# Patient Record
Sex: FEMALE
[De-identification: names, ages, dates, MRNs, and addresses within clinical notes are randomized per-mention and may not be internally consistent; named-entity substitution may affect disease eponyms.]

---

## 2017-10-25 ENCOUNTER — HOSPITAL (OUTPATIENT)
Dept: OTHER | Age: 67
End: 2017-10-25
Attending: ORTHOPAEDIC SURGERY

## 2017-11-30 ENCOUNTER — HOSPITAL (OUTPATIENT)
Dept: OTHER | Age: 67
End: 2017-11-30
Attending: INTERNAL MEDICINE

## 2017-11-30 LAB
ANALYZER ANC (IANC): ABNORMAL
ANION GAP SERPL CALC-SCNC: 10 MMOL/L (ref 10–20)
APPEARANCE FLD: ABNORMAL
BASOPHILS # BLD: 0 THOUSAND/MCL (ref 0–0.3)
BASOPHILS NFR BLD: 0 %
BASOPHILS NFR SNV: ABNORMAL %
BUN SERPL-MCNC: 8 MG/DL (ref 6–20)
BUN/CREAT SERPL: 9 (ref 7–25)
CALCIUM SERPL-MCNC: 9.3 MG/DL (ref 8.4–10.2)
CHLORIDE: 103 MMOL/L (ref 98–107)
CO2 SERPL-SCNC: 36 MMOL/L (ref 21–32)
CREAT SERPL-MCNC: 0.87 MG/DL (ref 0.51–0.95)
CRYSTALS SNV MICRO: ABNORMAL
DIFFERENTIAL METHOD BLD: ABNORMAL
EOSINOPHIL # BLD: 0 THOUSAND/MCL (ref 0.1–0.5)
EOSINOPHIL NFR BLD: 1 %
EOSINOPHIL NFR SNV: ABNORMAL %
ERYTHROCYTE [DISTWIDTH] IN BLOOD: 16.3 % (ref 11–15)
GLUCOSE SERPL-MCNC: 109 MG/DL (ref 65–99)
HEMATOCRIT: 30.9 % (ref 36–46.5)
HGB BLD-MCNC: 9.7 GM/DL (ref 12–15.5)
LYMPHOCYTES # BLD: 1.6 THOUSAND/MCL (ref 1–4)
LYMPHOCYTES NFR BLD: 21 %
LYMPHOCYTES NFR SNV: 4 % (ref 0–78)
MCH RBC QN AUTO: 25.3 PG (ref 26–34)
MCHC RBC AUTO-ENTMCNC: 31.4 GM/DL (ref 32–36.5)
MCV RBC AUTO: 80.7 FL (ref 78–100)
MONOCYTES # BLD: 0.6 THOUSAND/MCL (ref 0.3–0.9)
MONOCYTES NFR BLD: 7 %
MONOS+MACROS NFR SNV: 3 % (ref 0–71)
NEUTROPHILS # BLD: 5.3 THOUSAND/MCL (ref 1.8–7.7)
NEUTROPHILS NFR BLD: 71 %
NEUTS SEG NFR BLD: ABNORMAL %
NEUTS SEG NFR SNV: 93 % (ref 0–25)
NUC CELL # SNV MANUAL: ABNORMAL /MCL (ref 0–200)
PERCENT NRBC: ABNORMAL
PLATELET # BLD: 316 THOUSAND/MCL (ref 140–450)
POTASSIUM SERPL-SCNC: 3.3 MMOL/L (ref 3.4–5.1)
RBC # BLD: 3.83 MILLION/MCL (ref 4–5.2)
SODIUM SERPL-SCNC: 146 MMOL/L (ref 135–145)
SPECIMEN SOURCE: ABNORMAL
SPECIMEN VOL FLD: 38 ML
WBC # BLD: 7.5 THOUSAND/MCL (ref 4.2–11)

## 2017-12-01 LAB
ANALYZER ANC (IANC): ABNORMAL
ANION GAP SERPL CALC-SCNC: 6 MMOL/L (ref 10–20)
BASOPHILS # BLD: 0 THOUSAND/MCL (ref 0–0.3)
BASOPHILS NFR BLD: 0 %
BUN SERPL-MCNC: 9 MG/DL (ref 6–20)
BUN/CREAT SERPL: 12 (ref 7–25)
CALCIUM SERPL-MCNC: 8.6 MG/DL (ref 8.4–10.2)
CHLORIDE: 106 MMOL/L (ref 98–107)
CO2 SERPL-SCNC: 34 MMOL/L (ref 21–32)
CREAT SERPL-MCNC: 0.73 MG/DL (ref 0.51–0.95)
DIFFERENTIAL METHOD BLD: ABNORMAL
EOSINOPHIL # BLD: 0 THOUSAND/MCL (ref 0.1–0.5)
EOSINOPHIL NFR BLD: 0 %
ERYTHROCYTE [DISTWIDTH] IN BLOOD: 16.1 % (ref 11–15)
GLUCOSE SERPL-MCNC: 127 MG/DL (ref 65–99)
HEMATOCRIT: 28.1 % (ref 36–46.5)
HGB BLD-MCNC: 8.9 GM/DL (ref 12–15.5)
INR PPP: 1.1
LYMPHOCYTES # BLD: 1 THOUSAND/MCL (ref 1–4)
LYMPHOCYTES NFR BLD: 15 %
MCH RBC QN AUTO: 25.4 PG (ref 26–34)
MCHC RBC AUTO-ENTMCNC: 31.7 GM/DL (ref 32–36.5)
MCV RBC AUTO: 80.1 FL (ref 78–100)
MONOCYTES # BLD: 0.2 THOUSAND/MCL (ref 0.3–0.9)
MONOCYTES NFR BLD: 2 %
NEUTROPHILS # BLD: 5.8 THOUSAND/MCL (ref 1.8–7.7)
NEUTROPHILS NFR BLD: 83 %
NEUTS SEG NFR BLD: ABNORMAL %
PERCENT NRBC: ABNORMAL
PLATELET # BLD: 284 THOUSAND/MCL (ref 140–450)
POTASSIUM SERPL-SCNC: 4 MMOL/L (ref 3.4–5.1)
PROTHROMBIN TIME: 11.6 SECONDS (ref 9.7–11.8)
PROTHROMBIN TIME: NORMAL
RBC # BLD: 3.51 MILLION/MCL (ref 4–5.2)
SODIUM SERPL-SCNC: 142 MMOL/L (ref 135–145)
WBC # BLD: 7 THOUSAND/MCL (ref 4.2–11)

## 2017-12-02 LAB
ANALYZER ANC (IANC): ABNORMAL
ANION GAP SERPL CALC-SCNC: 8 MMOL/L (ref 10–20)
BASOPHILS # BLD: 0 THOUSAND/MCL (ref 0–0.3)
BASOPHILS NFR BLD: 0 %
BUN SERPL-MCNC: 8 MG/DL (ref 6–20)
BUN/CREAT SERPL: 10 (ref 7–25)
CALCIUM SERPL-MCNC: 8.2 MG/DL (ref 8.4–10.2)
CHLORIDE: 107 MMOL/L (ref 98–107)
CO2 SERPL-SCNC: 31 MMOL/L (ref 21–32)
CREAT SERPL-MCNC: 0.83 MG/DL (ref 0.51–0.95)
DIFFERENTIAL METHOD BLD: ABNORMAL
EOSINOPHIL # BLD: 0 THOUSAND/MCL (ref 0.1–0.5)
EOSINOPHIL NFR BLD: 0 %
ERYTHROCYTE [DISTWIDTH] IN BLOOD: 16.3 % (ref 11–15)
GLUCOSE SERPL-MCNC: 115 MG/DL (ref 65–99)
HEMATOCRIT: 26.1 % (ref 36–46.5)
HGB BLD-MCNC: 8.3 GM/DL (ref 12–15.5)
LYMPHOCYTES # BLD: 1.5 THOUSAND/MCL (ref 1–4)
LYMPHOCYTES NFR BLD: 15 %
MCH RBC QN AUTO: 25.5 PG (ref 26–34)
MCHC RBC AUTO-ENTMCNC: 31.8 GM/DL (ref 32–36.5)
MCV RBC AUTO: 80.3 FL (ref 78–100)
MONOCYTES # BLD: 0.5 THOUSAND/MCL (ref 0.3–0.9)
MONOCYTES NFR BLD: 5 %
NEUTROPHILS # BLD: 8 THOUSAND/MCL (ref 1.8–7.7)
NEUTROPHILS NFR BLD: 80 %
NEUTS SEG NFR BLD: ABNORMAL %
PERCENT NRBC: ABNORMAL
PLATELET # BLD: 262 THOUSAND/MCL (ref 140–450)
POTASSIUM SERPL-SCNC: 3.1 MMOL/L (ref 3.4–5.1)
RBC # BLD: 3.25 MILLION/MCL (ref 4–5.2)
SODIUM SERPL-SCNC: 143 MMOL/L (ref 135–145)
WBC # BLD: 10.1 THOUSAND/MCL (ref 4.2–11)

## 2018-10-16 PROCEDURE — 81003 URINALYSIS AUTO W/O SCOPE: CPT | Performed by: INTERNAL MEDICINE

## 2019-01-01 ENCOUNTER — EXTERNAL RECORD (OUTPATIENT)
Dept: HEALTH INFORMATION MANAGEMENT | Facility: OTHER | Age: 69
End: 2019-01-01

## 2019-01-09 PROCEDURE — 87086 URINE CULTURE/COLONY COUNT: CPT | Performed by: INTERNAL MEDICINE

## 2019-01-09 PROCEDURE — 81001 URINALYSIS AUTO W/SCOPE: CPT | Performed by: INTERNAL MEDICINE

## 2019-01-15 PROCEDURE — 87624 HPV HI-RISK TYP POOLED RSLT: CPT | Performed by: OBSTETRICS & GYNECOLOGY

## 2019-01-15 PROCEDURE — 87625 HPV TYPES 16 & 18 ONLY: CPT | Performed by: OBSTETRICS & GYNECOLOGY

## 2019-01-15 PROCEDURE — 88175 CYTOPATH C/V AUTO FLUID REDO: CPT | Performed by: OBSTETRICS & GYNECOLOGY

## 2019-02-04 PROCEDURE — 88305 TISSUE EXAM BY PATHOLOGIST: CPT | Performed by: INTERNAL MEDICINE

## 2019-09-26 PROCEDURE — 81001 URINALYSIS AUTO W/SCOPE: CPT | Performed by: INTERNAL MEDICINE

## 2019-09-26 RX ORDER — HYDROCODONE BITARTRATE AND ACETAMINOPHEN 10; 325 MG/1; MG/1
1 TABLET ORAL EVERY 6 HOURS PRN
COMMUNITY

## 2019-09-30 ENCOUNTER — HOSPITAL ENCOUNTER (INPATIENT)
Facility: HOSPITAL | Age: 69
LOS: 2 days | Discharge: HOME HEALTH | DRG: 470 | End: 2019-10-02
Attending: ORTHOPAEDIC SURGERY | Admitting: ORTHOPAEDIC SURGERY
Payer: MEDICARE

## 2019-09-30 ENCOUNTER — ANESTHESIA EVENT (OUTPATIENT)
Dept: SURGERY | Facility: HOSPITAL | Age: 69
DRG: 470 | End: 2019-09-30
Payer: MEDICARE

## 2019-09-30 ENCOUNTER — APPOINTMENT (OUTPATIENT)
Dept: GENERAL RADIOLOGY | Facility: HOSPITAL | Age: 69
DRG: 470 | End: 2019-09-30
Attending: ORTHOPAEDIC SURGERY
Payer: MEDICARE

## 2019-09-30 ENCOUNTER — ANESTHESIA (OUTPATIENT)
Dept: SURGERY | Facility: HOSPITAL | Age: 69
DRG: 470 | End: 2019-09-30
Payer: MEDICARE

## 2019-09-30 PROBLEM — M17.10 ARTHRITIS OF KNEE, DEGENERATIVE: Status: ACTIVE | Noted: 2019-09-30

## 2019-09-30 PROCEDURE — 88311 DECALCIFY TISSUE: CPT | Performed by: ORTHOPAEDIC SURGERY

## 2019-09-30 PROCEDURE — 88305 TISSUE EXAM BY PATHOLOGIST: CPT | Performed by: ORTHOPAEDIC SURGERY

## 2019-09-30 PROCEDURE — 97110 THERAPEUTIC EXERCISES: CPT

## 2019-09-30 PROCEDURE — 73560 X-RAY EXAM OF KNEE 1 OR 2: CPT | Performed by: ORTHOPAEDIC SURGERY

## 2019-09-30 PROCEDURE — S0028 INJECTION, FAMOTIDINE, 20 MG: HCPCS

## 2019-09-30 PROCEDURE — 0SRC0J9 REPLACEMENT OF RIGHT KNEE JOINT WITH SYNTHETIC SUBSTITUTE, CEMENTED, OPEN APPROACH: ICD-10-PCS | Performed by: ORTHOPAEDIC SURGERY

## 2019-09-30 PROCEDURE — 97162 PT EVAL MOD COMPLEX 30 MIN: CPT

## 2019-09-30 DEVICE — SIGMA LCS COMPLETE TIBIAL TRAY ROTATING PLATFORM M.B.T. KEEL POROCOAT SIZE 3 CEMENTLESS
Type: IMPLANTABLE DEVICE | Site: KNEE | Status: FUNCTIONAL
Brand: SIGMA LCS COMPLETE POROCOAT

## 2019-09-30 DEVICE — P.F.C. SIGMA TIBIAL INSERT FIXED BEARING CURVED 3 (CVD) 12.5MM GVF
Type: IMPLANTABLE DEVICE | Site: KNEE | Status: FUNCTIONAL
Brand: P.F.C. SIGMA

## 2019-09-30 DEVICE — TOTAL KNEE SIGMA RP POROUS: Type: IMPLANTABLE DEVICE | Status: FUNCTIONAL

## 2019-09-30 DEVICE — P.F.C. SIGMA OVAL DOME PATELLA 3-PEG 41MM CEMENTED
Type: IMPLANTABLE DEVICE | Site: KNEE | Status: FUNCTIONAL
Brand: P.F.C. SIGMA

## 2019-09-30 RX ORDER — DICYCLOMINE HCL 20 MG
20 TABLET ORAL EVERY 4 HOURS PRN
Status: DISCONTINUED | OUTPATIENT
Start: 2019-09-30 | End: 2019-10-02

## 2019-09-30 RX ORDER — CYCLOBENZAPRINE HCL 5 MG
5 TABLET ORAL 3 TIMES DAILY PRN
Status: DISCONTINUED | OUTPATIENT
Start: 2019-09-30 | End: 2019-10-02

## 2019-09-30 RX ORDER — DIPHENHYDRAMINE HYDROCHLORIDE 50 MG/ML
12.5 INJECTION INTRAMUSCULAR; INTRAVENOUS EVERY 4 HOURS PRN
Status: DISCONTINUED | OUTPATIENT
Start: 2019-09-30 | End: 2019-10-02

## 2019-09-30 RX ORDER — ONDANSETRON 2 MG/ML
INJECTION INTRAMUSCULAR; INTRAVENOUS AS NEEDED
Status: DISCONTINUED | OUTPATIENT
Start: 2019-09-30 | End: 2019-09-30 | Stop reason: SURG

## 2019-09-30 RX ORDER — GABAPENTIN 600 MG/1
600 TABLET ORAL ONCE
Status: COMPLETED | OUTPATIENT
Start: 2019-09-30 | End: 2019-09-30

## 2019-09-30 RX ORDER — HYDROCODONE BITARTRATE AND ACETAMINOPHEN 5; 325 MG/1; MG/1
2 TABLET ORAL AS NEEDED
Status: DISCONTINUED | OUTPATIENT
Start: 2019-09-30 | End: 2019-09-30 | Stop reason: HOSPADM

## 2019-09-30 RX ORDER — DIPHENHYDRAMINE HCL 25 MG
25 CAPSULE ORAL EVERY 4 HOURS PRN
Status: DISCONTINUED | OUTPATIENT
Start: 2019-09-30 | End: 2019-10-02

## 2019-09-30 RX ORDER — ONDANSETRON 2 MG/ML
4 INJECTION INTRAMUSCULAR; INTRAVENOUS ONCE AS NEEDED
Status: DISCONTINUED | OUTPATIENT
Start: 2019-09-30 | End: 2019-09-30 | Stop reason: HOSPADM

## 2019-09-30 RX ORDER — SODIUM CHLORIDE 9 MG/ML
INJECTION, SOLUTION INTRAVENOUS CONTINUOUS
Status: DISCONTINUED | OUTPATIENT
Start: 2019-09-30 | End: 2019-10-01

## 2019-09-30 RX ORDER — ROCURONIUM BROMIDE 10 MG/ML
INJECTION, SOLUTION INTRAVENOUS AS NEEDED
Status: DISCONTINUED | OUTPATIENT
Start: 2019-09-30 | End: 2019-09-30 | Stop reason: SURG

## 2019-09-30 RX ORDER — MORPHINE SULFATE 10 MG/ML
6 INJECTION, SOLUTION INTRAMUSCULAR; INTRAVENOUS EVERY 10 MIN PRN
Status: DISCONTINUED | OUTPATIENT
Start: 2019-09-30 | End: 2019-09-30 | Stop reason: HOSPADM

## 2019-09-30 RX ORDER — SODIUM PHOSPHATE, DIBASIC AND SODIUM PHOSPHATE, MONOBASIC 7; 19 G/133ML; G/133ML
1 ENEMA RECTAL ONCE AS NEEDED
Status: DISCONTINUED | OUTPATIENT
Start: 2019-09-30 | End: 2019-10-02

## 2019-09-30 RX ORDER — DIPHENHYDRAMINE HYDROCHLORIDE 50 MG/ML
25 INJECTION INTRAMUSCULAR; INTRAVENOUS ONCE AS NEEDED
Status: ACTIVE | OUTPATIENT
Start: 2019-09-30 | End: 2019-09-30

## 2019-09-30 RX ORDER — MORPHINE SULFATE 4 MG/ML
4 INJECTION, SOLUTION INTRAMUSCULAR; INTRAVENOUS EVERY 2 HOUR PRN
Status: DISCONTINUED | OUTPATIENT
Start: 2019-09-30 | End: 2019-10-02

## 2019-09-30 RX ORDER — HYDROMORPHONE HYDROCHLORIDE 1 MG/ML
0.4 INJECTION, SOLUTION INTRAMUSCULAR; INTRAVENOUS; SUBCUTANEOUS EVERY 5 MIN PRN
Status: DISCONTINUED | OUTPATIENT
Start: 2019-09-30 | End: 2019-09-30 | Stop reason: HOSPADM

## 2019-09-30 RX ORDER — MORPHINE SULFATE 2 MG/ML
2 INJECTION, SOLUTION INTRAMUSCULAR; INTRAVENOUS EVERY 2 HOUR PRN
Status: DISCONTINUED | OUTPATIENT
Start: 2019-09-30 | End: 2019-10-02

## 2019-09-30 RX ORDER — SODIUM CHLORIDE, SODIUM LACTATE, POTASSIUM CHLORIDE, CALCIUM CHLORIDE 600; 310; 30; 20 MG/100ML; MG/100ML; MG/100ML; MG/100ML
INJECTION, SOLUTION INTRAVENOUS CONTINUOUS
Status: DISCONTINUED | OUTPATIENT
Start: 2019-09-30 | End: 2019-10-01

## 2019-09-30 RX ORDER — METOCLOPRAMIDE 10 MG/1
10 TABLET ORAL ONCE
Status: DISCONTINUED | OUTPATIENT
Start: 2019-09-30 | End: 2019-09-30 | Stop reason: HOSPADM

## 2019-09-30 RX ORDER — GLYCOPYRROLATE 0.2 MG/ML
INJECTION INTRAMUSCULAR; INTRAVENOUS AS NEEDED
Status: DISCONTINUED | OUTPATIENT
Start: 2019-09-30 | End: 2019-09-30 | Stop reason: SURG

## 2019-09-30 RX ORDER — FAMOTIDINE 10 MG/ML
20 INJECTION, SOLUTION INTRAVENOUS 2 TIMES DAILY
Status: DISCONTINUED | OUTPATIENT
Start: 2019-09-30 | End: 2019-10-01

## 2019-09-30 RX ORDER — PROCHLORPERAZINE EDISYLATE 5 MG/ML
10 INJECTION INTRAMUSCULAR; INTRAVENOUS EVERY 6 HOURS PRN
Status: ACTIVE | OUTPATIENT
Start: 2019-09-30 | End: 2019-10-02

## 2019-09-30 RX ORDER — SUMATRIPTAN 50 MG/1
50 TABLET, FILM COATED ORAL EVERY 2 HOUR PRN
Status: DISCONTINUED | OUTPATIENT
Start: 2019-09-30 | End: 2019-10-02

## 2019-09-30 RX ORDER — HYDRALAZINE HYDROCHLORIDE 20 MG/ML
10 INJECTION INTRAMUSCULAR; INTRAVENOUS EVERY 4 HOURS PRN
Status: DISCONTINUED | OUTPATIENT
Start: 2019-09-30 | End: 2019-10-02

## 2019-09-30 RX ORDER — HYDROCODONE BITARTRATE AND ACETAMINOPHEN 10; 325 MG/1; MG/1
1 TABLET ORAL EVERY 6 HOURS PRN
Status: DISCONTINUED | OUTPATIENT
Start: 2019-09-30 | End: 2019-10-02

## 2019-09-30 RX ORDER — SODIUM CHLORIDE, SODIUM LACTATE, POTASSIUM CHLORIDE, CALCIUM CHLORIDE 600; 310; 30; 20 MG/100ML; MG/100ML; MG/100ML; MG/100ML
INJECTION, SOLUTION INTRAVENOUS CONTINUOUS
Status: DISCONTINUED | OUTPATIENT
Start: 2019-09-30 | End: 2019-09-30 | Stop reason: HOSPADM

## 2019-09-30 RX ORDER — CYCLOBENZAPRINE HCL 10 MG
10 TABLET ORAL EVERY 8 HOURS PRN
Status: DISCONTINUED | OUTPATIENT
Start: 2019-09-30 | End: 2019-09-30

## 2019-09-30 RX ORDER — MIDAZOLAM HYDROCHLORIDE 1 MG/ML
INJECTION INTRAMUSCULAR; INTRAVENOUS AS NEEDED
Status: DISCONTINUED | OUTPATIENT
Start: 2019-09-30 | End: 2019-09-30 | Stop reason: SURG

## 2019-09-30 RX ORDER — LIDOCAINE HYDROCHLORIDE 10 MG/ML
INJECTION, SOLUTION EPIDURAL; INFILTRATION; INTRACAUDAL; PERINEURAL AS NEEDED
Status: DISCONTINUED | OUTPATIENT
Start: 2019-09-30 | End: 2019-09-30 | Stop reason: SURG

## 2019-09-30 RX ORDER — BISACODYL 10 MG
10 SUPPOSITORY, RECTAL RECTAL
Status: DISCONTINUED | OUTPATIENT
Start: 2019-09-30 | End: 2019-10-02

## 2019-09-30 RX ORDER — SCOLOPAMINE TRANSDERMAL SYSTEM 1 MG/1
1 PATCH, EXTENDED RELEASE TRANSDERMAL ONCE
Status: DISCONTINUED | OUTPATIENT
Start: 2019-09-30 | End: 2019-09-30

## 2019-09-30 RX ORDER — FAMOTIDINE 20 MG/1
20 TABLET ORAL ONCE
Status: DISCONTINUED | OUTPATIENT
Start: 2019-09-30 | End: 2019-09-30 | Stop reason: HOSPADM

## 2019-09-30 RX ORDER — FAMOTIDINE 20 MG/1
20 TABLET ORAL 2 TIMES DAILY
Status: DISCONTINUED | OUTPATIENT
Start: 2019-09-30 | End: 2019-10-01

## 2019-09-30 RX ORDER — HYDROCODONE BITARTRATE AND ACETAMINOPHEN 5; 325 MG/1; MG/1
1 TABLET ORAL AS NEEDED
Status: DISCONTINUED | OUTPATIENT
Start: 2019-09-30 | End: 2019-09-30 | Stop reason: HOSPADM

## 2019-09-30 RX ORDER — TOPIRAMATE 25 MG/1
25 TABLET ORAL 2 TIMES DAILY
Status: DISCONTINUED | OUTPATIENT
Start: 2019-09-30 | End: 2019-10-02

## 2019-09-30 RX ORDER — NALOXONE HYDROCHLORIDE 0.4 MG/ML
80 INJECTION, SOLUTION INTRAMUSCULAR; INTRAVENOUS; SUBCUTANEOUS AS NEEDED
Status: DISCONTINUED | OUTPATIENT
Start: 2019-09-30 | End: 2019-09-30 | Stop reason: HOSPADM

## 2019-09-30 RX ORDER — HALOPERIDOL 5 MG/ML
0.25 INJECTION INTRAMUSCULAR ONCE AS NEEDED
Status: DISCONTINUED | OUTPATIENT
Start: 2019-09-30 | End: 2019-09-30 | Stop reason: HOSPADM

## 2019-09-30 RX ORDER — HYDROCODONE BITARTRATE AND ACETAMINOPHEN 7.5; 325 MG/1; MG/1
2 TABLET ORAL EVERY 4 HOURS PRN
Status: DISCONTINUED | OUTPATIENT
Start: 2019-09-30 | End: 2019-10-02

## 2019-09-30 RX ORDER — HYDROMORPHONE HYDROCHLORIDE 1 MG/ML
0.6 INJECTION, SOLUTION INTRAMUSCULAR; INTRAVENOUS; SUBCUTANEOUS EVERY 5 MIN PRN
Status: DISCONTINUED | OUTPATIENT
Start: 2019-09-30 | End: 2019-09-30 | Stop reason: HOSPADM

## 2019-09-30 RX ORDER — ZOLPIDEM TARTRATE 10 MG/1
10 TABLET ORAL NIGHTLY
Status: DISCONTINUED | OUTPATIENT
Start: 2019-09-30 | End: 2019-10-02

## 2019-09-30 RX ORDER — VANCOMYCIN HYDROCHLORIDE 1 G/20ML
INJECTION, POWDER, LYOPHILIZED, FOR SOLUTION INTRAVENOUS AS NEEDED
Status: DISCONTINUED | OUTPATIENT
Start: 2019-09-30 | End: 2019-09-30 | Stop reason: HOSPADM

## 2019-09-30 RX ORDER — AMLODIPINE BESYLATE 2.5 MG/1
2.5 TABLET ORAL
Status: DISCONTINUED | OUTPATIENT
Start: 2019-09-30 | End: 2019-10-02

## 2019-09-30 RX ORDER — POLYETHYLENE GLYCOL 3350 17 G/17G
17 POWDER, FOR SOLUTION ORAL DAILY PRN
Status: DISCONTINUED | OUTPATIENT
Start: 2019-09-30 | End: 2019-10-02

## 2019-09-30 RX ORDER — DOCUSATE SODIUM 100 MG/1
100 CAPSULE, LIQUID FILLED ORAL 2 TIMES DAILY
Status: DISCONTINUED | OUTPATIENT
Start: 2019-09-30 | End: 2019-10-02

## 2019-09-30 RX ORDER — SODIUM CHLORIDE 0.9 % (FLUSH) 0.9 %
10 SYRINGE (ML) INJECTION AS NEEDED
Status: DISCONTINUED | OUTPATIENT
Start: 2019-09-30 | End: 2019-10-02

## 2019-09-30 RX ORDER — METOCLOPRAMIDE HYDROCHLORIDE 5 MG/ML
10 INJECTION INTRAMUSCULAR; INTRAVENOUS EVERY 6 HOURS PRN
Status: ACTIVE | OUTPATIENT
Start: 2019-09-30 | End: 2019-10-02

## 2019-09-30 RX ORDER — ESCITALOPRAM OXALATE 10 MG/1
10 TABLET ORAL NIGHTLY
Status: DISCONTINUED | OUTPATIENT
Start: 2019-09-30 | End: 2019-10-02

## 2019-09-30 RX ORDER — MORPHINE SULFATE 4 MG/ML
2 INJECTION, SOLUTION INTRAMUSCULAR; INTRAVENOUS EVERY 10 MIN PRN
Status: DISCONTINUED | OUTPATIENT
Start: 2019-09-30 | End: 2019-09-30 | Stop reason: HOSPADM

## 2019-09-30 RX ORDER — HYDRALAZINE HYDROCHLORIDE 25 MG/1
25 TABLET, FILM COATED ORAL EVERY 8 HOURS PRN
Status: DISCONTINUED | OUTPATIENT
Start: 2019-09-30 | End: 2019-10-02

## 2019-09-30 RX ORDER — MORPHINE SULFATE 2 MG/ML
1 INJECTION, SOLUTION INTRAMUSCULAR; INTRAVENOUS EVERY 2 HOUR PRN
Status: DISCONTINUED | OUTPATIENT
Start: 2019-09-30 | End: 2019-10-02

## 2019-09-30 RX ORDER — ACETAMINOPHEN 500 MG
1000 TABLET ORAL ONCE
Status: DISCONTINUED | OUTPATIENT
Start: 2019-09-30 | End: 2019-09-30 | Stop reason: HOSPADM

## 2019-09-30 RX ORDER — MORPHINE SULFATE 4 MG/ML
4 INJECTION, SOLUTION INTRAMUSCULAR; INTRAVENOUS EVERY 10 MIN PRN
Status: DISCONTINUED | OUTPATIENT
Start: 2019-09-30 | End: 2019-09-30 | Stop reason: HOSPADM

## 2019-09-30 RX ORDER — ACETAMINOPHEN 325 MG/1
650 TABLET ORAL EVERY 4 HOURS PRN
Status: DISCONTINUED | OUTPATIENT
Start: 2019-09-30 | End: 2019-10-02

## 2019-09-30 RX ORDER — PANTOPRAZOLE SODIUM 40 MG/1
40 TABLET, DELAYED RELEASE ORAL
Status: DISCONTINUED | OUTPATIENT
Start: 2019-10-01 | End: 2019-10-02

## 2019-09-30 RX ORDER — PROCHLORPERAZINE EDISYLATE 5 MG/ML
5 INJECTION INTRAMUSCULAR; INTRAVENOUS ONCE AS NEEDED
Status: DISCONTINUED | OUTPATIENT
Start: 2019-09-30 | End: 2019-09-30 | Stop reason: HOSPADM

## 2019-09-30 RX ORDER — HYDROCODONE BITARTRATE AND ACETAMINOPHEN 7.5; 325 MG/1; MG/1
1 TABLET ORAL EVERY 4 HOURS PRN
Status: DISCONTINUED | OUTPATIENT
Start: 2019-09-30 | End: 2019-10-02

## 2019-09-30 RX ORDER — HYDROMORPHONE HYDROCHLORIDE 1 MG/ML
0.2 INJECTION, SOLUTION INTRAMUSCULAR; INTRAVENOUS; SUBCUTANEOUS EVERY 5 MIN PRN
Status: DISCONTINUED | OUTPATIENT
Start: 2019-09-30 | End: 2019-09-30 | Stop reason: HOSPADM

## 2019-09-30 RX ORDER — NEOSTIGMINE METHYLSULFATE 0.5 MG/ML
INJECTION INTRAVENOUS AS NEEDED
Status: DISCONTINUED | OUTPATIENT
Start: 2019-09-30 | End: 2019-09-30 | Stop reason: SURG

## 2019-09-30 RX ORDER — ONDANSETRON 2 MG/ML
4 INJECTION INTRAMUSCULAR; INTRAVENOUS EVERY 4 HOURS PRN
Status: DISCONTINUED | OUTPATIENT
Start: 2019-09-30 | End: 2019-10-02

## 2019-09-30 RX ADMIN — GLYCOPYRROLATE 0.6 MG: 0.2 INJECTION INTRAMUSCULAR; INTRAVENOUS at 11:22:00

## 2019-09-30 RX ADMIN — NEOSTIGMINE METHYLSULFATE 3 MG: 0.5 INJECTION INTRAVENOUS at 11:22:00

## 2019-09-30 RX ADMIN — MIDAZOLAM HYDROCHLORIDE 2 MG: 1 INJECTION INTRAMUSCULAR; INTRAVENOUS at 10:22:00

## 2019-09-30 RX ADMIN — LIDOCAINE HYDROCHLORIDE 50 MG: 10 INJECTION, SOLUTION EPIDURAL; INFILTRATION; INTRACAUDAL; PERINEURAL at 10:22:00

## 2019-09-30 RX ADMIN — ROCURONIUM BROMIDE 40 MG: 10 INJECTION, SOLUTION INTRAVENOUS at 10:22:00

## 2019-09-30 RX ADMIN — ONDANSETRON 4 MG: 2 INJECTION INTRAMUSCULAR; INTRAVENOUS at 11:00:00

## 2019-09-30 NOTE — H&P
[facility] Hospitalist Team  History and Physical  Admit Date: 9/30/19    ASSESSMENT / PLAN:   79 yo female with anemia, HIV, MGUS, HTN, GERD, migraines, depression, insomnia, bipolar and OA who is S/P Right Knee Replacement, see below for details    S/P Right Kn wrap     [facility]  Past Medical History:   Diagnosis Date   • Anemia    • Bipolar disorder, unspecified ([facility] [address] 2000    Dr. Bravo Smiley last visit 9/19   • Displacement of lumbar intervertebral disc without myelopathy     L3-4, L4-5   • Diverticulosis of colon (without UNKNOWN  Sulfa Antibiotics           Comment:Allergic to celebrex which has a sulfa component  Aspirin                 OTHER (SEE COMMENTS)    Comment:STOMACH BURNING ONLY  Azithromycin            OTHER (SEE COMMENTS)    Comment:headache     Home Medicatio Problems Daughter    • No Known Problems Son    • No Known Problems Brother    • No Known Problems Sister        Review of Systems  A comprehensive 10 point review of systems was completed. Pertinent positives and negatives noted in the the HPI.       DIAG

## 2019-09-30 NOTE — ANESTHESIA PROCEDURE NOTES
Airway  Date/Time: 9/30/2019 10:24 AM  Urgency: elective      General Information and Staff    Patient location during procedure: OR  Anesthesiologist: Sudeep Alonzo MD  Performed: anesthesiologist     Indications and Patient Condition  Indications f

## 2019-09-30 NOTE — OPERATIVE REPORT
DATE OF SURGERY: 09/30/2019  Operative Note  Surgeon: Mike Dai MD  Anesthesia Type:  General  Pre-Op Diagnosis:     (1) Unilateral primary osteoarthritis, right knee  Post-Op Diagnosis:     (1) Unilateral primary osteoarthritis, right knee     Procedure We opened up the right knee and noted severe arthritis throughout the knee. We then exposed the distal femur, placed our custom cutting guide, drilled it and pinned it in place and then resected the distal femur, sizing it to a 3.   We then placed the fou

## 2019-09-30 NOTE — INTERVAL H&P NOTE
Pre-op Diagnosis: right knee osteoarthritis    The above referenced H&P was reviewed by Pooja Campos MD on 9/30/2019, the patient was examined and no significant changes have occurred in the patient's condition since the H&P was performed.   I discussed with

## 2019-09-30 NOTE — PHYSICAL THERAPY NOTE
PHYSICAL THERAPY KNEE EVALUATION - INPATIENT       Room Number: 422/422-A  Evaluation Date: 9/30/2019  Type of Evaluation: Initial  Physician Order: PT Eval and Treat    Presenting Problem: R TKA  Reason for Therapy: Mobility Dysfunction and Discharge Plan Replacement\"     Problem List  Principal Problem:    Arthritis of knee, degenerative      Past Medical History  Past Medical History:   Diagnosis Date   • Anemia    • B[facility], unspecified ([facility]    Dr. Aakash Carrillo last visit 9/19   • Displacement of Layout: One level  Stairs to Enter : 4  Railing: Yes  Stairs to Bedroom: 0       Lives With: Spouse; Son             Prior Level of [place]: Pt was independent w/ ADLs and amb w/o AD. SUBJECTIVE  \"You want me to sit in the chair? \"    PHYSICAL THE Bed Mobility: NT    Transfers: SBA    Exercise/Education Provided:  Bed mobility  Gait training  Lower therapeutic exercise:   Ankle pumps  Heel raises  LAQ  Leg slides  Quad sets  Transfer training    Patient End of Session: Up in chair;Needs met;Call (0) swallows foods/liquids without difficulty

## 2019-09-30 NOTE — PLAN OF CARE
Problem: Patient/Family Goals  Goal: Patient/Family Long Term Goal  Description  Patient's Long Term Goal: to have my pain controlled    Interventions:  - try non- pharmacological interventions   - See additional Care Plan goals for specific intervention during anticipated neutropenic period  Description  INTERVENTIONS  - Monitor WBC  - Administer growth factors as ordered  - Implement neutropenic guidelines  Outcome: Progressing     Problem: SAFETY ADULT - FALL  Goal: Free from fall injury  Description  I precautions in place. IVF Patient has call light within reach. Patient will call for assistance.

## 2019-09-30 NOTE — ANESTHESIA POSTPROCEDURE EVALUATION
Patient: Linette Rios    Procedure Summary     Date:  09/30/19 Room / Location:  [facility] OR  / [facility] OR    Anesthesia Start:  4431 Anesthesia Stop:      Procedure:  KNEE TOTAL REPLACEMENT (Right Knee) Diagnosis:  (right knee osteoarthritis)    Sánchez

## 2019-09-30 NOTE — ANESTHESIA PREPROCEDURE EVALUATION
Anesthesia PreOp Note    HPI:     India Qureshi is a 80year old female who presents for preoperative consultation requested by: Steve Kirkpatrick MD    Date of Surgery: 9/30/2019    Procedure(s):  KNEE TOTAL REPLACEMENT  Indication: right knee osteoarthriti intervertebral disc without myelopathy     L3-4, L4-5   • Diverticulosis of colon (without mention of hemorrhage) 2006   • History of EKG 09/25/2019    stable LVH, nsr, nsstw, c/w 10/4/17   • [facility]    ID - 9/29/2019 at Unknown time   Zolpidem Tartrate 10 MG Oral Tab Take 10 mg by mouth nightly. Disp:  Rfl:  9/29/2019 at Unknown time   escitalopram 10 MG Oral Tab Take 10 mg by mouth nightly.  Disp:  Rfl:  9/29/2019 at Unknown time   topiramate 25 MG Oral Tab sodium chloride 0.9% syringe  Intra-articular Once Han Beltran MD      No current [facility]-ordered outpatient medications on file.       Celebrex [Celecoxib]    RASH  Depakote [Divalproe*    RASH  Dihydroergotamine M*    OTHER (SEE COMMENTS)    Comment:BP MAYNARD file      Stress: Not on file    Relationships      Social connections:        Talks on phone: Not on file        Gets together: Not on file        Attends Jainism service: Not on file        Active member of club or organization: Not on file        Atte BP:  122/86   Pulse:  60   Resp:  14   Temp:  97.6 °F (36.4 °C)   SpO2:  98%   Weight: 77.1 kg (170 lb) 78.9 kg (174 lb)   Height: 1.626 m (5' 4\") 1.651 m (5' 5\")        Anesthesia Evaluation      Airway   Mallampati: I  TM distance: >3 FB  Neck ROM: f

## 2019-10-01 PROCEDURE — 97116 GAIT TRAINING THERAPY: CPT

## 2019-10-01 PROCEDURE — 97530 THERAPEUTIC ACTIVITIES: CPT

## 2019-10-01 PROCEDURE — 97110 THERAPEUTIC EXERCISES: CPT

## 2019-10-01 PROCEDURE — 80048 BASIC METABOLIC PNL TOTAL CA: CPT | Performed by: ORTHOPAEDIC SURGERY

## 2019-10-01 PROCEDURE — 84132 ASSAY OF SERUM POTASSIUM: CPT | Performed by: NURSE PRACTITIONER

## 2019-10-01 PROCEDURE — 97535 SELF CARE MNGMENT TRAINING: CPT

## 2019-10-01 PROCEDURE — 85027 COMPLETE CBC AUTOMATED: CPT | Performed by: ORTHOPAEDIC SURGERY

## 2019-10-01 PROCEDURE — 97165 OT EVAL LOW COMPLEX 30 MIN: CPT

## 2019-10-01 PROCEDURE — S0028 INJECTION, FAMOTIDINE, 20 MG: HCPCS | Performed by: ORTHOPAEDIC SURGERY

## 2019-10-01 RX ORDER — POTASSIUM CHLORIDE 20 MEQ/1
40 TABLET, EXTENDED RELEASE ORAL EVERY 4 HOURS
Status: COMPLETED | OUTPATIENT
Start: 2019-10-01 | End: 2019-10-01

## 2019-10-01 NOTE — CM/SW NOTE
10/1: AURORA received MDO for fresh post-op. SW attempted to meet with patient to discus discharge planning needs. Pt states she lives home with spouse and son in a one level home with 4 steps to enter.  Pt states she is independent with all ADLs/IADLs and stat

## 2019-10-01 NOTE — PROGRESS NOTES
Chris Villareal Patient Status:  Inpatient    1950 MRN [id number]   Location [facility] 4W/SW/SE Attending Basilia Harada, MD   [facility] Day # 1 PCP Sienna Ibarra MD     Subjective:  Post-Operative Day: 1 Status Post right T 400 MG/5ML suspension 30 mL 30 mL Oral Daily PRN   bisacodyl (DULCOLAX) rectal suppository 10 mg 10 mg Rectal Daily PRN   FLEET ENEMA (FLEET) 7-19 GM/118ML enema 133 mL 1 enema Rectal Once PRN   ondansetron HCl (ZOFRAN) injection 4 mg 4 mg Intravenous Q4H DVT prophylaxis  - WBAT  - PT/OT  - Mobilize  - Norco PRN for pain inpatient and at home/rehab  - Eliquis for inpatient DVT prophylaxis  - Ecotrin 325 mg BID for DVT prophylaxis at home/rehab  - Prineo dressing may remain open to air unless there is draina

## 2019-10-01 NOTE — OCCUPATIONAL THERAPY NOTE
OCCUPATIONAL THERAPY EVALUATION - INPATIENT     Room Number: 422/422-A  Evaluation Date: 10/1/2019  Type of Evaluation: Initial       Physician Order: IP Consult to Occupational Therapy  Reason for Therapy: ADL/IADL Dysfunction and Discharge Planning right knee 09/25/2019    TKR planned 9/30/19 with Dr. Wes Castro   • Personal history of colonic polyps 2006   • Radial styloid tenosynovitis 2008    LEFT  - resolved       Past Surgical History  Past Surgical History:   Procedure Laterality Date   • COLONOSCOP Upper extremity ROM is within functional limits     STRENGTH ASSESSMENT  Upper extremity strength is within functional limits     ACTIVITIES OF DAILY LIVING ASSESSMENT  AM-PAC ‘6-Clicks’ Inpatient Daily Activity Short Form  How much help from another per

## 2019-10-01 NOTE — PLAN OF CARE
Patient alert and oriented x4. VSS. CMS intact. Patient on room air. SCD to left leg. Has not passed gas yet. Up with walker x 1 assist. Voiding. Pain managed with norco. Tolerating diet well with no nausea or vomiting.  Plan to go home tomorrow 10/2 with H pain management  - Manage/alleviate anxiety  - Utilize distraction and/or relaxation techniques  - Monitor for opioid side effects  - Notify MD/LIP if interventions unsuccessful or patient reports new pain  - Anticipate increased pain with activity and pre physician/LIP order or complex needs related to functional status, cognitive ability or social support system  Outcome: Progressing

## 2019-10-01 NOTE — PLAN OF CARE
Comments: Pt alert, calls appropriately for assistance. Post-op Hay singh per order. Eliquis started @ for DVT prophx. Norco for pain management. OOB x1a/walker. Ace wrap over site is c/d/I. Voiding ok of urine.  Plan to be determined after visit from SW request assistance  - Assess pain using appropriate pain scale  - Administer analgesics based on type and severity of pain and evaluate response  - Implement non-pharmacological measures as appropriate and evaluate response  - Consider cultural and social etc)  - Arrange for interpreters to assist at discharge as needed  - Consider post-discharge preferences of patient/family/discharge partner  - Complete POLST form as appropriate  - Assess patient's ability to be responsible for managing their own health

## 2019-10-01 NOTE — PHYSICAL THERAPY NOTE
PHYSICAL THERAPY KNEE TREATMENT NOTE - INPATIENT     Room Number: 422/422-A             Presenting Problem: R TKA    Problem List  Principal Problem:    Arthritis of knee, degenerative      PHYSICAL THERAPY ASSESSMENT     Patient was see BID today for ther up from a chair with arms (e.g., wheelchair, bedside commode, etc.): A Little   -   Moving from lying on back to sitting on the side of the bed?: A Little   How much help from another person does the patient currently need. ..   -   Moving to and from a bed negotiate 4 stairs/one curb w/ assistive device and supervision   Goal #4   Current Status NT   Goal #5  AROM 0 degrees extension to 95 degrees flexion     Goal #5   Current Status In progress   Goal #6 Patient independently performs home exercise program

## 2019-10-02 VITALS
BODY MASS INDEX: 28.99 KG/M2 | HEART RATE: 78 BPM | SYSTOLIC BLOOD PRESSURE: 140 MMHG | OXYGEN SATURATION: 98 % | TEMPERATURE: 99 F | RESPIRATION RATE: 16 BRPM | HEIGHT: 65 IN | DIASTOLIC BLOOD PRESSURE: 66 MMHG | WEIGHT: 174 LBS

## 2019-10-02 PROCEDURE — 85027 COMPLETE CBC AUTOMATED: CPT | Performed by: ORTHOPAEDIC SURGERY

## 2019-10-02 PROCEDURE — 80048 BASIC METABOLIC PNL TOTAL CA: CPT | Performed by: NURSE PRACTITIONER

## 2019-10-02 PROCEDURE — 97116 GAIT TRAINING THERAPY: CPT

## 2019-10-02 PROCEDURE — 97110 THERAPEUTIC EXERCISES: CPT

## 2019-10-02 PROCEDURE — 97530 THERAPEUTIC ACTIVITIES: CPT

## 2019-10-02 PROCEDURE — 85025 COMPLETE CBC W/AUTO DIFF WBC: CPT | Performed by: NURSE PRACTITIONER

## 2019-10-02 NOTE — PLAN OF CARE
Patient has remained free from falls throughout stay. Hourly rounding maintained. Pt's bed in lowest position w/ side rails up. Patient has been educated and is compliant w/ call light system. Patient is up with standby and walker.  Patient has been Momo Mcintosh Assess pain using appropriate pain scale  - Administer analgesics based on type and severity of pain and evaluate response  - Implement non-pharmacological measures as appropriate and evaluate response  - Consider cultural and social influences on pain and preferences of patient/family/discharge partner  - Complete POLST form as appropriate  - Assess patient's ability to be responsible for managing their own health  - Refer to Case Management Department for coordinating discharge planning if the patient need for Discharge  Goal: Maintain proper alignment of affected body part  Description  INTERVENTIONS:  - Support and protect limb and body alignment per provider's orders  - Instruct and reinforce with patient and family use of appropriate assistive device and

## 2019-10-02 NOTE — PHYSICAL THERAPY NOTE
PHYSICAL THERAPY KNEE TREATMENT NOTE - INPATIENT     Room Number: 422/422-A             Presenting Problem: R TKA    Problem List  Principal Problem:    Arthritis of knee, degenerative      PHYSICAL THERAPY ASSESSMENT     Patient was see BID today for ther (e.g., wheelchair, bedside commode, etc.): A Little   -   Moving from lying on back to sitting on the side of the bed?: A Little   How much help from another person does the patient currently need. ..   -   Moving to and from a bed to a chair (including a w Goal #4 Patient will negotiate 4 stairs/one curb w/ assistive device and supervision   Goal #4   Current Status 4 stairs with Min A   Goal #5  AROM 0 degrees extension to 95 degrees flexion     Goal #5   Current Status In progress   Goal #6 Patient indep

## 2019-10-02 NOTE — CM/SW NOTE
Cleared to [place] home with Maximino Pacheco per PT.    SW confirmed with [facility] that patient is getting discharged today, 10.2.19    Maximino Pacheco orders entered.     [address] [facility] [phone]      ADDENDUM 10.2.19 12:44 PM      Re-referred to One Maximino Pacheco, per liaison, their pre-ort

## 2019-10-02 NOTE — DISCHARGE SUMMARY
[facility] Hospitalist Discharge Summary   Patient ID:  India Qureshi  [id number]  85 year old  12/19/1950    Admit date: 9/30/2019  Discharge date: 10/2/2019    Primary Care Physician: Broderick Vines MD   Attending Physician: Steve Kirkpatrick MD   Consults:   Matilde West no apparent distress  NEURO: A/A Ox3  RESP: non labored, CTA  CARDIO: Regular, no murmur  ABD: soft, NT, ND, BS+  EXTREMITIES: right leg with ace wrap    Operative Procedures: Procedure(s) (LRB):  KNEE TOTAL REPLACEMENT (Right)  Radiology:   Xr Knee (1 Or information:  [address] [facility]             Kathrine Garrison MD. Schedule an appointment as soon as possible for a visit in 2 weeks.     Specialties:  SURGERY, ORTHOPEDIC, Surgery, Orthopaedic  Why:  hospital

## 2019-10-02 NOTE — PROGRESS NOTES
Chris Mono Patient Status:  Inpatient    1950 MRN [id number]   Location [facility] 4W/SW/SE Attending Miguel Bryant MD   Hosp Day # 2 PCP Luz Nixon MD     Subjective:  Post-Operative Day: 2 Status Post right T injection 4 mg 4 mg Intravenous Q4H PRN   Metoclopramide HCl (REGLAN) injection 10 mg 10 mg Intravenous Q6H PRN   Prochlorperazine Edisylate (COMPAZINE) injection 10 mg 10 mg Intravenous Q6H PRN   diphenhydrAMINE (BENADRYL) cap/tab 25 mg 25 mg Oral Q4H PRN three.       LOS: 2 day

## 2019-10-02 NOTE — PAYOR COMM NOTE
REQUESTING ADDITIONAL DAY. [address]      CONTINUED STAY REVIEW    Secondary Payor: [facility]  Subscriber #:  [id number]  Authorization Number: [id number]    Admit date: 9/30/19  Admit time: 1443    Admitting Physician: Montana Brooks MD  Attending Phys Tartrate (AMBIEN) tab 10 mg     Date Action Dose Route User    10/1/2019 2131 Given 10 mg Oral Edu Hicks RN        10/1 ORTHO NOTE  Subjective:  Post-Operative Day: 1 Status Post right Total Knee Arthroplasty  Systemic or Specific Complaints: sumatriptan     HTN  -continue norvasc  -prn hydralazine  -follow BP     FEN  -lytes in am  -IVF  -diet-ADAT     Prophy  -SCD  -eliquis      Dispo  -pending clinical coarse  - PT/OT - home w/ therapies likely tomorrow      Monik Cordoba MD    SUBJECT prophylaxis at home/rehab  - Prineo dressing may remain open to air unless there is drainage. The patient may shower with the dressing uncovered at postop day three.     [facility] Hospitalist Discharge Summary   Patient ID:  Melanie Vuong  [id number]  49 year weeks     HIV  -continue home meds     Depression/Bipolar Disorder/nsomnia  -continue home meds     Migraines  -prn sumatriptan     HTN  -continue norvasc  -follow BP as outpt     EXAM:   GENERAL: no apparent distress  NEURO: A/A Ox3  RESP: non labored, CT Oint  Commonly known as:  BACTROBAN             Important follow up:       Follow-up Information      Schedule an appointment as soon as possible for a visit with Carla Cordero MD.    Specialty:  Internal Medicine  Contact information:  Chris Sahni

## 2019-10-02 NOTE — PLAN OF CARE
Kristi Finch is now POD#2 after having a right TKA. She is up with 1 assist and the walker. Voiding freely without difficulties. Norco given prn for pain management. Eliquis, leonidas's and magan hose for dvt prophylaxis.  Denies nausea or dizziness thus far this shift limitations  - Instruct pt to call for assistance with activity based on assessment  - Modify environment to reduce risk of injury  - Provide assistive devices as appropriate  - Consider OT/PT consult to assist with strengthening/mobility  - Encourage toil patient  - Hold pressure on venipuncture sites to achieve adequate hemostasis  - Assess for signs and symptoms of internal bleeding  - Monitor lab trends  Outcome: Progressing     Problem: MUSCULOSKELETAL - ADULT  Goal: Return mobility to safest level of f

## 2019-10-02 NOTE — FACE TO FACE NOTE
[facility]   Face to Face Encounter Note    India Qureshi Patient Status:  Inpatient    1950 MRN [id number]   Location [facility] 4W/SW/SE Attending Steve Kirkpatrick MD   Hosp Day # 2 PCP Broderick Vines MD       I, or a non-physician by a physician who will periodically review the plan of care. The findings from this face-to-face encounter have been communicated with the patient's community-based physician who will be assuming this patient's home health plan of care.     Roosevelt SAVAGE

## 2019-10-03 NOTE — PAYOR COMM NOTE
--------------  DISCHARGE REVIEW    Payor: Valentino Breed #Cholo Cunningham  Authorization Number: [id number]    Admit date: 9/30/19  Admit time:  1944  Discharge Date: 10/2/2019  4:54 PM     Admitting Physician: Yasmin Bautista MD  Attending Caroy depression, insomnia, bipolar and OA     Pt moaning in pain. Having pain in right knee. Had general only. Unable to get additional hx due to pain.  Hx obtained via chart review     Hospital Course:     79 yo female with anemia, HIV, MGUS, HTN, GERD, migrain Tabs  Commonly known as:  NORCO     Pantoprazole Sodium 40 MG Tbec  Commonly known as:  PROTONIX  TAKE 1 TABLET (40 MG TOTAL) BY MOUTH EVERY MORNING BEFORE BREAKFAST.      SUMAtriptan Succinate 50 MG Tabs  Commonly known as:  IMITREX  Take 1 tablet (50 mg t RTKA  - eliquis 2.5mg bid x 4 weeks for dvt ppx   - ortho follow up   - home pt/ot     ABLA  - expected from surgery  - pcp follow up     HIV  - cont biktarvy     Time spent - greater than 30min.      Yaya Cruz DO  [facility] Hospitalist  Pager: [phone]

## 2019-10-03 NOTE — PAYOR COMM NOTE
--------------  DISCHARGE REVIEW    Secondary Payor: [facility]  Subscriber #:  [id number]  Authorization Number: [id number]      Admit date: 9/30/19  Admit time:  9914  Discharge Date: 10/2/2019  4:54 PM     Admitting Physician: Paulino Engel MD  Attending Ph depression, insomnia, bipolar and OA     Pt moaning in pain. Having pain in right knee. Had general only. Unable to get additional hx due to pain.  Hx obtained via chart review     Hospital Course:     81 yo female with anemia, HIV, MGUS, HTN, GERD, migrain Tabs  Commonly known as:  NORCO     Pantoprazole Sodium 40 MG Tbec  Commonly known as:  PROTONIX  TAKE 1 TABLET (40 MG TOTAL) BY MOUTH EVERY MORNING BEFORE BREAKFAST.      SUMAtriptan Succinate 50 MG Tabs  Commonly known as:  IMITREX  Take 1 tablet (50 mg t RTKA  - eliquis 2.5mg bid x 4 weeks for dvt ppx   - ortho follow up   - home pt/ot     ABLA  - expected from surgery  - pcp follow up     HIV  - cont biktarvy     Time spent - greater than 30min.      Jelly Mulligan [facility] Hospitalist  Pager: [phone]

## 2020-01-01 ENCOUNTER — EXTERNAL RECORD (OUTPATIENT)
Dept: HEALTH INFORMATION MANAGEMENT | Facility: OTHER | Age: 70
End: 2020-01-01

## 2021-01-01 ENCOUNTER — EXTERNAL RECORD (OUTPATIENT)
Dept: HEALTH INFORMATION MANAGEMENT | Facility: OTHER | Age: 71
End: 2021-01-01

## 2021-10-07 PROBLEM — K55.9 ISCHEMIC COLITIS (HCC): Status: ACTIVE | Noted: 2021-10-07

## 2022-04-24 ENCOUNTER — EXTERNAL RECORD (OUTPATIENT)
Dept: HEALTH INFORMATION MANAGEMENT | Facility: OTHER | Age: 72
End: 2022-04-24

## 2023-04-11 ENCOUNTER — EXTERNAL LAB (OUTPATIENT)
Dept: OTHER | Age: 73
End: 2023-04-11

## 2023-04-11 LAB
25(OH)D3+25(OH)D2 SERPL-MCNC: 10 NG/ML (ref 30–80)
ABSOLUTE NRBC (AUTO): 0 X10'3/MICROL
ALBUMIN SERPL-MCNC: 4 G/DL (ref 3.5–5.7)
ALP SERPL-CCNC: 60 UNITS/L (ref 34–104)
ALT SERPL-CCNC: 6 UNITS/L (ref 7–52)
ANION GAP SERPL CALC-SCNC: 8 MMOL/L (ref 6.2–14.7)
AST SERPL-CCNC: 14 UNITS/L (ref 13–39)
BILIRUB SERPL-MCNC: 0.3 MG/DL (ref 0.3–1)
BUN SERPL-MCNC: 21 MG/DL (ref 7–25)
CALCIUM SERPL-MCNC: 9.5 MG/DL (ref 8.6–10.4)
CHLORIDE SERPL-SCNC: 110 MEQ/L (ref 98–107)
CHOLEST SERPL-MCNC: 177 MG/DL
CO2 SERPL-SCNC: 23 MEQ/L (ref 21–31)
CORTIS SERPL-MCNC: 6.9 MCG/DL
CREAT SERPL-MCNC: 0.93 MG/DL (ref 0.6–1.2)
ERYTHROCYTE [DISTWIDTH] IN BLOOD: 15.3 % (ref 11.5–14.5)
EST CRCL: ABNORMAL ML/MIN
GFR SERPLBLD SCHWARTZ-ARVRAT: 65.3 ML/MIN 1.73/M^2
GLUCOSE SERPL-MCNC: 90 MG/DL (ref 70–99)
HCT VFR BLD CALC: 35.2 % (ref 38–50)
HDLC SERPL-MCNC: 47 MG/DL (ref 40–59)
HGB BLD-MCNC: 10.7 G/DL (ref 12.1–16.4)
INSULIN SERPL-ACNC: 7 MICROINTER.UNITS/ML (ref 3–25)
LDLC SERPL CALC-MCNC: 117 MG/DL (ref 0–129)
LENGTH OF FAST TIME PATIENT: ABNORMAL H
LENGTH OF FAST TIME PATIENT: NORMAL H
MAGNESIUM SERPL-MCNC: 2.2 MG/DL (ref 1.6–2.6)
MCH RBC QN AUTO: 25.2 PG (ref 26–34)
MCHC RBC AUTO-ENTMCNC: 30.4 G/DL (ref 30.6–37)
MCV RBC AUTO: 82.8 FL (ref 79–98)
MPV (OFFPRE2): 10
NONHDLC SERPL-MCNC: 130 MG/DL
NRBC BLD MANUAL-RTO: 0 %
PHOSPHATE SERPL-MCNC: 3.7 MG/DL (ref 2.5–4.5)
PLATELET # BLD: 214 X10'3/MICROL (ref 125–400)
POTASSIUM SERPL-SCNC: 4.2 MMOL/L (ref 3.5–5.1)
PROT SERPL-MCNC: 7.6 G/DL (ref 6.4–8.9)
RBC # BLD: 4.25 X10'6/MICROL (ref 4–5.6)
SODIUM SERPL-SCNC: 141 MEQ/L (ref 133–144)
T4 FREE SERPL-MCNC: 0.78 NG/DL (ref 0.55–1.6)
TRIGL SERPL-MCNC: 67 MG/DL
TSH SERPL-ACNC: 0.79 MICROINTER.UNITS/ML (ref 0.27–4.2)
URATE SERPL-MCNC: 3.6 MG/DL (ref 2.3–6.6)
VLDLC SERPL CALC-MCNC: 13 MG/DL (ref 0–30)
WBC # BLD: 6.3 X10'3/MICROL (ref 4.5–11)

## 2024-07-09 NOTE — PROGRESS NOTES
[facility] Hospitalist Team  Progress Note    Butch Subramanian Patient Status:  Inpatient    1950 MRN [id number]   Location [facility] 4W/SW/SE Attending Bishop Samira MD   Hosp Day # 1 PCP Danny Thurman MD     CC: Follow Up  PCP: Td Melchor 239 226.0       Recent Labs   Lab 09/26/19  1202 10/01/19  0510 10/01/19  0622   * 143  --    K 3.70  --  3.6    114*  --    CO2 24.1 24.0  --    BUN 6.0* 7  --    CREATSERUM 1.01 0.85  --    CA  --  8.6  --    GLU 88 120*  --        Recent Lab (COMPAZINE) injection 10 mg 10 mg Intravenous Q6H PRN   famoTIDine (PEPCID) tab 20 mg 20 mg Oral BID   Or      famoTIDine (PEPCID) injection 20 mg 20 mg Intravenous BID   diphenhydrAMINE (BENADRYL) cap/tab 25 mg 25 mg Oral Q4H PRN   Or      diphenhydrAMINE 173

## 2025-07-21 ENCOUNTER — NURSING HOME VISIT (OUTPATIENT)
Dept: POST ACUTE CARE | Age: 75
End: 2025-07-21

## 2025-07-21 DIAGNOSIS — M00.9 INFECTION OF LEFT KNEE  (CMD): ICD-10-CM

## 2025-07-21 DIAGNOSIS — R53.81 DEBILITY: ICD-10-CM

## 2025-07-21 DIAGNOSIS — G43.019 INTRACTABLE MIGRAINE WITHOUT AURA AND WITHOUT STATUS MIGRAINOSUS: ICD-10-CM

## 2025-07-21 DIAGNOSIS — S81.002D OPEN KNEE WOUND, LEFT, SUBSEQUENT ENCOUNTER: ICD-10-CM

## 2025-07-21 DIAGNOSIS — Z21 HIV POSITIVE  (CMD): Primary | ICD-10-CM

## 2025-07-21 PROCEDURE — 99306 1ST NF CARE HIGH MDM 50: CPT | Performed by: INTERNAL MEDICINE

## 2025-07-22 ENCOUNTER — NURSING HOME VISIT (OUTPATIENT)
Dept: POST ACUTE CARE | Age: 75
End: 2025-07-22

## 2025-07-22 VITALS
DIASTOLIC BLOOD PRESSURE: 84 MMHG | HEART RATE: 87 BPM | OXYGEN SATURATION: 98 % | HEIGHT: 64 IN | TEMPERATURE: 97.8 F | RESPIRATION RATE: 18 BRPM | WEIGHT: 132.2 LBS | BODY MASS INDEX: 22.57 KG/M2 | SYSTOLIC BLOOD PRESSURE: 137 MMHG

## 2025-07-22 VITALS
SYSTOLIC BLOOD PRESSURE: 117 MMHG | DIASTOLIC BLOOD PRESSURE: 74 MMHG | TEMPERATURE: 98 F | HEART RATE: 74 BPM | RESPIRATION RATE: 18 BRPM | OXYGEN SATURATION: 98 %

## 2025-07-22 DIAGNOSIS — Z96.652 PRESENCE OF LEFT ARTIFICIAL KNEE JOINT: Primary | ICD-10-CM

## 2025-07-22 DIAGNOSIS — M00.9 INFECTION OF LEFT KNEE  (CMD): ICD-10-CM

## 2025-07-22 DIAGNOSIS — R53.81 DEBILITY: ICD-10-CM

## 2025-07-22 DIAGNOSIS — F51.05 INSOMNIA DUE TO OTHER MENTAL DISORDER: ICD-10-CM

## 2025-07-22 DIAGNOSIS — F99 INSOMNIA DUE TO OTHER MENTAL DISORDER: ICD-10-CM

## 2025-07-22 DIAGNOSIS — Z21 HIV POSITIVE  (CMD): ICD-10-CM

## 2025-07-22 DIAGNOSIS — Z86.59 HISTORY OF BIPOLAR DISORDER: ICD-10-CM

## 2025-07-22 DIAGNOSIS — S81.002D OPEN KNEE WOUND, LEFT, SUBSEQUENT ENCOUNTER: ICD-10-CM

## 2025-07-22 DIAGNOSIS — G43.019 INTRACTABLE MIGRAINE WITHOUT AURA AND WITHOUT STATUS MIGRAINOSUS: ICD-10-CM

## 2025-07-22 DIAGNOSIS — Z87.898 HISTORY OF SEIZURES: ICD-10-CM

## 2025-07-22 DIAGNOSIS — R54 FRAILTY: ICD-10-CM

## 2025-07-22 PROBLEM — B20 CURRENTLY ASYMPTOMATIC HIV INFECTION, WITH HISTORY OF HIV-RELATED ILLNESS  (CMD): Status: ACTIVE | Noted: 2025-07-22

## 2025-07-22 PROBLEM — G47.00 INSOMNIA: Status: ACTIVE | Noted: 2025-07-22

## 2025-07-22 PROCEDURE — 99497 ADVNCD CARE PLAN 30 MIN: CPT

## 2025-07-22 PROCEDURE — 1158F ADVNC CARE PLAN TLK DOCD: CPT

## 2025-07-22 PROCEDURE — 1170F FXNL STATUS ASSESSED: CPT

## 2025-07-22 PROCEDURE — 99310 SBSQ NF CARE HIGH MDM 45: CPT

## 2025-07-22 PROCEDURE — 1126F AMNT PAIN NOTED NONE PRSNT: CPT

## 2025-07-22 PROCEDURE — 1157F ADVNC CARE PLAN IN RCRD: CPT

## 2025-07-22 PROCEDURE — 1160F RVW MEDS BY RX/DR IN RCRD: CPT

## 2025-07-22 RX ORDER — BICTEGRAVIR SODIUM, EMTRICITABINE, AND TENOFOVIR ALAFENAMIDE FUMARATE 50; 200; 25 MG/1; MG/1; MG/1
1 TABLET ORAL DAILY
COMMUNITY

## 2025-07-22 RX ORDER — RISPERIDONE 2 MG/1
2 TABLET, ORALLY DISINTEGRATING ORAL AT BEDTIME
COMMUNITY

## 2025-07-22 RX ORDER — CEPHALEXIN 500 MG/1
500 CAPSULE ORAL 4 TIMES DAILY
COMMUNITY

## 2025-07-22 RX ORDER — ASPIRIN 81 MG/1
81 TABLET ORAL DAILY
COMMUNITY

## 2025-07-22 RX ORDER — SUMATRIPTAN SUCCINATE 100 MG/1
100 TABLET ORAL PRN
COMMUNITY

## 2025-07-22 RX ORDER — TOPIRAMATE 100 MG/1
100 TABLET, FILM COATED ORAL 2 TIMES DAILY
COMMUNITY

## 2025-07-22 RX ORDER — TEMAZEPAM 30 MG/1
30 CAPSULE ORAL NIGHTLY PRN
COMMUNITY

## 2025-07-22 RX ORDER — OXYCODONE HYDROCHLORIDE 5 MG/1
5 TABLET ORAL EVERY 6 HOURS PRN
COMMUNITY

## 2025-07-22 ASSESSMENT — FRAILTY ASSESSMENTS
DO YOU HAVE DIFFICULTY WALKING ONE BLOCK: YES
FRAILTY SCALE TOTAL SCORE: 4
HAVE YOU FELT FATIGUED? MOST OR ALL OF THE TIME OVER THE PAST MONTH: YES
DO YOU HAVE ANY OF THESE ILLNESSES: HYPERTENSION, DIABETES, CANCER (OTHER THAN A MINOR SKIN CANCER), CHRONIC LUNG DISEASE, HEART ATTACK, CONGESTIVE HEART FAILURE, ANGINA, ASTHMA, ARTHRITIS, STROKE, AND KIDNEY DISEASE: YES
HAVE YOU LOST MORE THAN 5 PERCENT OF YOUR WEIGHT IN THE PAST YEAR: NO
DO YOU HAVE DIFFICULTY CLIMBING A FLIGHT OF STAIRS: YES

## 2025-07-22 ASSESSMENT — PATIENT HEALTH QUESTIONNAIRE - PHQ9
1. LITTLE INTEREST OR PLEASURE IN DOING THINGS: NOT AT ALL
2. FEELING DOWN, DEPRESSED OR HOPELESS: NOT AT ALL
SUM OF ALL RESPONSES TO PHQ9 QUESTIONS 1 AND 2: 0
CLINICAL INTERPRETATION OF PHQ2 SCORE: NO FURTHER SCREENING NEEDED
SUM OF ALL RESPONSES TO PHQ9 QUESTIONS 1 AND 2: 0

## 2025-07-22 ASSESSMENT — PAIN SCALES - GENERAL: PAINLEVEL_OUTOF10: 0

## 2025-07-22 ASSESSMENT — ENCOUNTER SYMPTOMS
WOUND: 1
APPETITE CHANGE: 1
ACTIVITY CHANGE: 1
WEAKNESS: 1
WOUND: 1
ACTIVITY CHANGE: 1
WEAKNESS: 1
APPETITE CHANGE: 1

## 2025-07-24 ENCOUNTER — NURSING HOME VISIT (OUTPATIENT)
Dept: POST ACUTE CARE | Age: 75
End: 2025-07-24

## 2025-07-24 VITALS
SYSTOLIC BLOOD PRESSURE: 117 MMHG | TEMPERATURE: 97.2 F | OXYGEN SATURATION: 98 % | DIASTOLIC BLOOD PRESSURE: 71 MMHG | RESPIRATION RATE: 18 BRPM | HEART RATE: 70 BPM

## 2025-07-24 DIAGNOSIS — G43.019 INTRACTABLE MIGRAINE WITHOUT AURA AND WITHOUT STATUS MIGRAINOSUS: ICD-10-CM

## 2025-07-24 DIAGNOSIS — Z86.59 HISTORY OF BIPOLAR DISORDER: ICD-10-CM

## 2025-07-24 DIAGNOSIS — Z21 HIV POSITIVE  (CMD): Primary | ICD-10-CM

## 2025-07-24 DIAGNOSIS — R53.81 DEBILITY: ICD-10-CM

## 2025-07-24 DIAGNOSIS — Z96.652 PRESENCE OF LEFT ARTIFICIAL KNEE JOINT: ICD-10-CM

## 2025-07-24 PROCEDURE — 99309 SBSQ NF CARE MODERATE MDM 30: CPT | Performed by: INTERNAL MEDICINE

## 2025-07-24 ASSESSMENT — ENCOUNTER SYMPTOMS
APPETITE CHANGE: 1
ACTIVITY CHANGE: 1
WOUND: 1
WEAKNESS: 1

## 2025-07-28 ENCOUNTER — NURSING HOME VISIT (OUTPATIENT)
Dept: POST ACUTE CARE | Age: 75
End: 2025-07-28

## 2025-07-28 VITALS
RESPIRATION RATE: 18 BRPM | OXYGEN SATURATION: 99 % | HEART RATE: 87 BPM | DIASTOLIC BLOOD PRESSURE: 69 MMHG | TEMPERATURE: 97.7 F | SYSTOLIC BLOOD PRESSURE: 127 MMHG

## 2025-07-28 DIAGNOSIS — G43.019 INTRACTABLE MIGRAINE WITHOUT AURA AND WITHOUT STATUS MIGRAINOSUS: Primary | ICD-10-CM

## 2025-07-28 DIAGNOSIS — Z21 HIV POSITIVE  (CMD): ICD-10-CM

## 2025-07-28 DIAGNOSIS — Z96.652 PRESENCE OF LEFT ARTIFICIAL KNEE JOINT: ICD-10-CM

## 2025-07-28 DIAGNOSIS — Z86.59 HISTORY OF BIPOLAR DISORDER: ICD-10-CM

## 2025-07-28 DIAGNOSIS — S81.002D OPEN KNEE WOUND, LEFT, SUBSEQUENT ENCOUNTER: ICD-10-CM

## 2025-07-28 PROCEDURE — 99309 SBSQ NF CARE MODERATE MDM 30: CPT | Performed by: INTERNAL MEDICINE

## 2025-07-28 ASSESSMENT — ENCOUNTER SYMPTOMS
WOUND: 1
WEAKNESS: 1
ACTIVITY CHANGE: 1
APPETITE CHANGE: 1

## 2025-07-29 ENCOUNTER — NURSING HOME VISIT (OUTPATIENT)
Dept: POST ACUTE CARE | Age: 75
End: 2025-07-29

## 2025-07-29 VITALS
RESPIRATION RATE: 19 BRPM | DIASTOLIC BLOOD PRESSURE: 64 MMHG | SYSTOLIC BLOOD PRESSURE: 111 MMHG | OXYGEN SATURATION: 98 % | TEMPERATURE: 97.2 F | HEART RATE: 76 BPM

## 2025-07-29 DIAGNOSIS — G43.019 INTRACTABLE MIGRAINE WITHOUT AURA AND WITHOUT STATUS MIGRAINOSUS: Primary | ICD-10-CM

## 2025-07-29 DIAGNOSIS — M00.9 INFECTION OF LEFT KNEE  (CMD): ICD-10-CM

## 2025-07-29 DIAGNOSIS — R54 FRAILTY: ICD-10-CM

## 2025-07-29 DIAGNOSIS — Z21 HIV POSITIVE  (CMD): ICD-10-CM

## 2025-07-29 DIAGNOSIS — F51.05 INSOMNIA DUE TO OTHER MENTAL DISORDER: ICD-10-CM

## 2025-07-29 DIAGNOSIS — R53.81 DEBILITY: ICD-10-CM

## 2025-07-29 DIAGNOSIS — Z86.59 HISTORY OF BIPOLAR DISORDER: ICD-10-CM

## 2025-07-29 DIAGNOSIS — Z87.898 HISTORY OF SEIZURES: ICD-10-CM

## 2025-07-29 DIAGNOSIS — F99 INSOMNIA DUE TO OTHER MENTAL DISORDER: ICD-10-CM

## 2025-07-29 DIAGNOSIS — S81.002D OPEN KNEE WOUND, LEFT, SUBSEQUENT ENCOUNTER: ICD-10-CM

## 2025-07-29 DIAGNOSIS — Z96.652 PRESENCE OF LEFT ARTIFICIAL KNEE JOINT: ICD-10-CM

## 2025-07-29 RX ORDER — BICTEGRAVIR SODIUM, EMTRICITABINE, AND TENOFOVIR ALAFENAMIDE FUMARATE 50; 200; 25 MG/1; MG/1; MG/1
1 TABLET ORAL DAILY
Qty: 30 TABLET | Refills: 0 | Status: SHIPPED | OUTPATIENT
Start: 2025-07-29

## 2025-07-29 RX ORDER — OXYCODONE HYDROCHLORIDE 5 MG/1
5 TABLET ORAL EVERY 6 HOURS PRN
Qty: 14 TABLET | Refills: 0 | Status: SHIPPED | OUTPATIENT
Start: 2025-07-29

## 2025-07-29 RX ORDER — RISPERIDONE 2 MG/1
2 TABLET, ORALLY DISINTEGRATING ORAL AT BEDTIME
Qty: 30 TABLET | Refills: 0 | Status: SHIPPED | OUTPATIENT
Start: 2025-07-29

## 2025-07-29 RX ORDER — SUMATRIPTAN SUCCINATE 100 MG/1
100 TABLET ORAL PRN
Qty: 30 TABLET | Refills: 0 | Status: SHIPPED | OUTPATIENT
Start: 2025-07-29

## 2025-07-29 RX ORDER — TEMAZEPAM 30 MG/1
30 CAPSULE ORAL NIGHTLY PRN
Qty: 30 CAPSULE | Refills: 0 | Status: SHIPPED | OUTPATIENT
Start: 2025-07-29

## 2025-07-29 RX ORDER — CEPHALEXIN 500 MG/1
500 CAPSULE ORAL 4 TIMES DAILY
Qty: 184 CAPSULE | Refills: 0 | Status: SHIPPED | OUTPATIENT
Start: 2025-07-29 | End: 2025-09-13

## 2025-07-29 RX ORDER — ASPIRIN 81 MG/1
81 TABLET ORAL DAILY
Qty: 30 TABLET | Refills: 0 | Status: SHIPPED | OUTPATIENT
Start: 2025-07-29

## 2025-07-29 RX ORDER — TOPIRAMATE 100 MG/1
100 TABLET, FILM COATED ORAL 2 TIMES DAILY
Qty: 30 TABLET | Refills: 0 | Status: SHIPPED | OUTPATIENT
Start: 2025-07-29

## 2025-07-29 ASSESSMENT — ENCOUNTER SYMPTOMS
APPETITE CHANGE: 1
WOUND: 1
ACTIVITY CHANGE: 1
WEAKNESS: 1

## 2025-07-29 ASSESSMENT — PAIN SCALES - GENERAL: PAINLEVEL_OUTOF10: 0

## (undated) DEVICE — ENCORE® LATEX ACCLAIM SIZE 8, STERILE LATEX POWDER-FREE SURGICAL GLOVE: Brand: ENCORE

## (undated) DEVICE — T5 HOOD WITH PEEL AWAY FACE SHIELD

## (undated) DEVICE — BLADE SAW SAGITTAL 19.5

## (undated) DEVICE — CONTAINER SPEC STR 4OZ GRY LID

## (undated) DEVICE — BOWL CEMENT MIX QUICK-VAC

## (undated) DEVICE — SUTURE VLOC 90 3-0 9\" 2044

## (undated) DEVICE — Device

## (undated) DEVICE — P.F.C. SIGMA TIBIAL INSERT FIXED BEARING CURVED 3 (CVD) 10MM GVF
Type: IMPLANTABLE DEVICE | Site: KNEE | Status: NON-FUNCTIONAL
Brand: P.F.C. SIGMA
Removed: 2019-09-30

## (undated) DEVICE — GAUZE SPONGES,12 PLY: Brand: CURITY

## (undated) DEVICE — 20 ML SYRINGE LUER-LOCK TIP: Brand: MONOJECT

## (undated) DEVICE — DUAL CUT SAGITTAL BLADE

## (undated) DEVICE — TOTAL KNEE: Brand: MEDLINE INDUSTRIES, INC.

## (undated) DEVICE — TRAY SKIN PREP PVP-1

## (undated) DEVICE — DERMABOND LIQUID ADHESIVE

## (undated) DEVICE — SOL  .9 1000ML BAG

## (undated) DEVICE — SUTURE PDS II 2-0 CP

## (undated) DEVICE — BATTERY

## (undated) DEVICE — NEEDLE HPO 18GA 1.5IN ECLPS

## (undated) DEVICE — DRAPE SHEET LG

## (undated) DEVICE — 450 ML BOTTLE OF 0.05% CHLORHEXIDINE GLUCONATE IN 99.95% STERILE WATER FOR IRRIGATION, USP AND APPLICATOR.: Brand: IRRISEPT ANTIMICROBIAL WOUND LAVAGE

## (undated) DEVICE — SUTURE MONOCRYL 2-0 SH

## (undated) DEVICE — CHLORAPREP 26ML APPLICATOR

## (undated) DEVICE — BIPOLAR SEALER 23-301-1 AQM MBS: Brand: AQUAMANTYS™

## (undated) DEVICE — SOL  .9 1000ML BTL

## (undated) NOTE — LETTER
October 2, 2019       [facility]   [place] [id number]       To Whom It May Concern:    Cortezjoe Simspon has been under our care regarding ongoing medical issues.  Because of this, she has been required to restr